# Patient Record
(demographics unavailable — no encounter records)

---

## 2024-10-29 NOTE — ASSESSMENT
[FreeTextEntry1] : Ms. CRISTIAN ENRIQUE is a 79 year old woman with long standing hx of asthma is here for evaluation.  #ILD - CT chest from Aug 2023 reviewed and discussed with patient.   "Mild progression of architectural distortion of the lung parenchyma with bronchial wall thickening, bronchiectasis, subpleural fibrosis and reticulations. Honeycombing in the lateral aspect of lower lobes. Consolidation of the findings is not consistent with usual interstitial pneumonia pattern of fibrosis. There is evidence of PFTs with moderately severe restriction, reduced DLCO. Patient denies any signs symptoms of rheumatologic disease. No hx of exposures ILD labs are largely unremarkable.  Overall c/w Idinopathic interstitial PNA Currently doing well -- Has been on prednisone since September 2023 with improvement in symptoms. Now down to 5mg, will continue at current dose for now -- repeat DEXA scan -- Recent echocardiogram with Dr. Deangelo alvarez normal LVEF however RT sided structure and PA pressures were not able to be measured- technically difficult study.  She has plans for stress test in mid March -  -- on Ofev since 5/10/24. No side effects.  Monitor LFTs every months for the first 3 months and periodically thereafter -- Sleep study pending  #?Asthma -reports longstanding history of asthma since childhood. Prior PFTs with no evidence of obstructive defect. -- c/w high dose symbicort for now  # Volume overload -maintained on 40mg alternating with 40mg bid dosing.  Finally euvolemic back to sleeping being down in her bed  #HCM - flu shot today  All questions answered. Patient in agreement with plan.  f/u in office in 3-4mo mo   detailed exam detailed exam

## 2024-10-29 NOTE — PHYSICAL EXAM
[No Cyanosis] : no cyanosis [FROM] : FROM [No Acute Distress] : no acute distress [Normal Oropharynx] : normal oropharynx [Normal Appearance] : normal appearance [No Neck Mass] : no neck mass [Normal Rate/Rhythm] : normal rate/rhythm [Normal S1, S2] : normal s1, s2 [No Murmurs] : no murmurs [No Resp Distress] : no resp distress [No Abnormalities] : no abnormalities [Benign] : benign [Normal Gait] : normal gait [No Clubbing] : no clubbing [No Edema] : no edema [Normal Color/ Pigmentation] : normal color/ pigmentation [No Focal Deficits] : no focal deficits [Oriented x3] : oriented x3 [Normal Affect] : normal affect [TextBox_68] : Few crackles at the bases

## 2024-10-29 NOTE — CONSULT LETTER
[Dear  ___] : Dear  [unfilled], [Consult Letter:] : I had the pleasure of evaluating your patient, [unfilled]. [Please see my note below.] : Please see my note below. [Consult Closing:] : Thank you very much for allowing me to participate in the care of this patient.  If you have any questions, please do not hesitate to contact me. [FreeTextEntry3] : Sincerely,  Clementina Krause MD [facility] [place] [facility] tel: [phone] fax: [phone]

## 2024-10-29 NOTE — HISTORY OF PRESENT ILLNESS
[Former] : former [< 20 pack-years] : < 20 pack-years [Never] : never [TextBox_4] : Ms. CRISTIAN ENRIQUE is a 79 year old woman with asthma and ILD who is here for f/u. Daughter Shirley   History Started on Prednisone 30 mg Sept 2023. Now down to 20 mg.   Reports definite improvement in her respiratory status since starting prednisone.  Shortness of breath and exercise tolerance have both improved, she was able to go to the mall with her grandkids last weekend, cough has improved.   Interval events: Has been compliant with Lasix.  Taking alternating doses of 40mg daily and bid. Started Ofev on 5/10. No side effects tolerating well. On prednisone 5mg Some SOB with weather changes. Using Symbicort   ROS:  denies seasonal/environmental allergies; denies chronic sinus issues, no hx of nasal polys denies fevers, chills, night sweats, unintentional weight loss denies known autoimmune disease  PMH: none Meds: per chart All: codeine SH: has a dog and a cat at home; former smoker (quit 1980s. smoked 1/4 ppdx 8 years), second hand smoke exposure FH: Denies family hx of pulmonary or autoimmune disease PMD:  Last Immunizations:  [TextBox_11] : 1/4 [TextBox_13] : 8 [YearQuit] : 1980

## 2025-02-27 NOTE — HISTORY OF PRESENT ILLNESS
[Former] : former [< 20 pack-years] : < 20 pack-years [Never] : never [TextBox_4] : Ms. CRISTIAN ENRIQUE is a 79 year old woman with asthma and ILD who is here for f/u. Daughter Shirley   History Started on Prednisone 30 mg Sept 2023. Now down to 20 mg.   Reports definite improvement in her respiratory status since starting prednisone.  Shortness of breath and exercise tolerance have both improved, she was able to go to the mall with her grandkids last weekend, cough has improved.   Interval events: Notes worsening shortness of breath when misses a dose of Lasix taking alternating doses of 40mg daily and bid. Started Ofev on 5/10. No side effects tolerating well. On prednisone 5mg Remains on Symbicort 160/4.5   ROS:  denies seasonal/environmental allergies; denies chronic sinus issues, no hx of nasal polys denies fevers, chills, night sweats, unintentional weight loss denies known autoimmune disease  PMH: none Meds: per chart All: codeine SH: has a dog and a cat at home; former smoker (quit 1980s. smoked 1/4 ppdx 8 years), second hand smoke exposure FH: Denies family hx of pulmonary or autoimmune disease PMD:  Last Immunizations:  [TextBox_11] : 1/4 [TextBox_13] : 8 [YearQuit] : 1980

## 2025-02-27 NOTE — PHYSICAL EXAM
[No Acute Distress] : no acute distress [Normal Oropharynx] : normal oropharynx [Normal Appearance] : normal appearance [No Neck Mass] : no neck mass [Normal Rate/Rhythm] : normal rate/rhythm [Normal S1, S2] : normal s1, s2 [No Murmurs] : no murmurs [No Resp Distress] : no resp distress [No Abnormalities] : no abnormalities [Benign] : benign [Normal Gait] : normal gait [No Clubbing] : no clubbing [No Cyanosis] : no cyanosis [No Edema] : no edema [FROM] : FROM [Normal Color/ Pigmentation] : normal color/ pigmentation [No Focal Deficits] : no focal deficits [Oriented x3] : oriented x3 [Normal Affect] : normal affect [TextBox_68] : bibasilar crackles at the bases [TextBox_105] : trace EDMUND

## 2025-02-27 NOTE — ASSESSMENT
[FreeTextEntry1] : Ms. CRISTIAN ENRIQUE is a 79 year old woman with long standing hx of asthma is here for evaluation.  #ILD - CT chest from Aug 2023 reviewed and discussed with patient.   "Mild progression of architectural distortion of the lung parenchyma with bronchial wall thickening, bronchiectasis, subpleural fibrosis and reticulations. Honeycombing in the lateral aspect of lower lobes. Consolidation of the findings is not consistent with usual interstitial pneumonia pattern of fibrosis. There is evidence of PFTs with moderately severe restriction, reduced DLCO. Patient denies any signs symptoms of rheumatologic disease. No hx of exposures,ILD labs are largely unremarkable.  Overall c/w Idinopathic interstitial PNA Currently doing well PFTs with worsening TLC (down to 46% from 55% prior). Repeat CT chest -- Has been on prednisone since September 2023 with improvement in symptoms. Now down to 5mg, will continue at current dose for now -- repeat DEXA scan pending -- Recent echocardiogram with Dr. Deangelo alvarez normal LVEF however RT sided structure and PA pressures were not able to be measured- technically difficult study.  She has plans for stress test in mid March -  -- on Ofev since 5/10/24. No side effects.  Monitor LFTs every months for the first 3 months and periodically thereafter -- WatchPAT study ordered  #?Asthma -reports longstanding history of asthma since childhood. Prior PFTs with no evidence of obstructive defect. --would like to wean inhalers. Decrease Symbicort 160/4.5 to 1 puff bid for now. Daughter will find out which inhalers are covered and adjust meds from there.   # Volume overload -maintained on 40mg alternating with 40mg bid dosing.  Finally euvolemic back to sleeping being down in her bed  #HCM - flu shot today  All questions answered. Patient in agreement with plan.  f/u in office in 3-4mo mo

## 2025-02-27 NOTE — CONSULT LETTER
[Dear  ___] : Dear  [unfilled], [Consult Letter:] : I had the pleasure of evaluating your patient, [unfilled]. [Please see my note below.] : Please see my note below. [Consult Closing:] : Thank you very much for allowing me to participate in the care of this patient.  If you have any questions, please do not hesitate to contact me. [FreeTextEntry3] : Sincerely,  Clementina Krause MD [facility] tel: [phone] fax: [phone]

## 2025-07-30 NOTE — HISTORY OF PRESENT ILLNESS
[Former] : former [< 20 pack-years] : < 20 pack-years [Never] : never [TextBox_4] : Ms. CRISTIAN ENRIQUE is a 79 year old woman with asthma and ILD who is here for f/u. Daughter Shirley   History Started on Prednisone 30 mg Sept 2023. Now down to 20 mg.   Reports definite improvement in her respiratory status since starting prednisone.  Shortness of breath and exercise tolerance have both improved, she was able to go to the mall with her grandkids last weekend, cough has improved.   2/2025:  Notes worsening shortness of breath when misses a dose of Lasix taking alternating doses of 40mg daily and bid. Started Ofev on 5/10. No side effects tolerating well. On prednisone 5mg. Remains on Symbicort 160/4.5  7/2025: Has been doing well. Respiratory status is great. Tolerating Ofev. Lost 15 lb since Oct 2024 (not trying)   ROS:  denies seasonal/environmental allergies; denies chronic sinus issues, no hx of nasal polys denies fevers, chills, night sweats, unintentional weight loss denies known autoimmune disease  PMH: none Meds: per chart All: codeine SH: has a dog and a cat at home; former smoker (quit 1980s. smoked 1/4 ppdx 8 years), second hand smoke exposure FH: Denies family hx of pulmonary or autoimmune disease PMD:  Last Immunizations:  [TextBox_11] : 1/4 [TextBox_13] : 8 [YearQuit] : 1980

## 2025-07-30 NOTE — PHYSICAL EXAM
[No Acute Distress] : no acute distress [Normal Oropharynx] : normal oropharynx [Normal Appearance] : normal appearance [No Neck Mass] : no neck mass [Normal Rate/Rhythm] : normal rate/rhythm [Normal S1, S2] : normal s1, s2 [No Murmurs] : no murmurs [No Resp Distress] : no resp distress [No Abnormalities] : no abnormalities [Benign] : benign [Normal Gait] : normal gait [No Clubbing] : no clubbing [No Cyanosis] : no cyanosis [No Edema] : no edema [FROM] : FROM [Normal Color/ Pigmentation] : normal color/ pigmentation [No Focal Deficits] : no focal deficits [Oriented x3] : oriented x3 [Normal Affect] : normal affect [TextBox_68] : few basilar crackles [TextBox_105] : trace EDMUND

## 2025-07-30 NOTE — CONSULT LETTER
[Dear  ___] : Dear  [unfilled], [Consult Letter:] : I had the pleasure of evaluating your patient, [unfilled]. [Please see my note below.] : Please see my note below. [Consult Closing:] : Thank you very much for allowing me to participate in the care of this patient.  If you have any questions, please do not hesitate to contact me. [FreeTextEntry3] : Sincerely,  Clementina Krause MD [facility] tel: [phone] fax: [phone]

## 2025-07-30 NOTE — ASSESSMENT
[FreeTextEntry1] : Ms. CRISTIAN ENRIQUE is a 79 year old woman with long standing hx of asthma is here for evaluation.  #Weight loss - unexplained.  Obtain CT chest/abd/pelvis  #ILD -  Currently doing well PFTs with worsening TLC (down to 46% from 55% prior). Repeat CT chest -- Has been on prednisone since September 2023 with improvement in symptoms. Now down to 5mg - plan for slow wean, detailed instructions provided -- repeat DEXA scan pending -- Recent echocardiogram with Dr. Deangelo alvarez normal LVEF however RT sided structure and PA pressures were not able to be measured- technically difficult study.  She has plans for stress test in mid March -  -- on Ofev since 5/10/24. No side effects.  Stable LFTs recently  LEIF - mild LEIF. Patient not interested in treatment.   #Asthma -reports longstanding history of asthma since childhood. PFTs with no evidence of obstructive defect. -on Symbicort 80/4.5. Doing well.   # Volume overload -maintained on 40mg alternating with 40mg bid dosing.  Euvolemic. Continue current regiment  #HCM - PCV in 2023. flu shot in the fall  All questions answered. Patient in agreement with plan.  f/u in office in 3-4mo mo